# Patient Record
Sex: MALE | Race: WHITE
[De-identification: names, ages, dates, MRNs, and addresses within clinical notes are randomized per-mention and may not be internally consistent; named-entity substitution may affect disease eponyms.]

---

## 2017-07-12 ENCOUNTER — APPOINTMENT (OUTPATIENT)
Dept: CARDIOLOGY | Facility: CLINIC | Age: 67
End: 2017-07-12

## 2017-07-12 ENCOUNTER — NON-APPOINTMENT (OUTPATIENT)
Age: 67
End: 2017-07-12

## 2017-07-12 VITALS
OXYGEN SATURATION: 97 % | TEMPERATURE: 98 F | SYSTOLIC BLOOD PRESSURE: 151 MMHG | DIASTOLIC BLOOD PRESSURE: 82 MMHG | HEART RATE: 60 BPM | HEIGHT: 65.5 IN | WEIGHT: 142 LBS | BODY MASS INDEX: 23.37 KG/M2

## 2017-07-12 VITALS — SYSTOLIC BLOOD PRESSURE: 129 MMHG | DIASTOLIC BLOOD PRESSURE: 78 MMHG

## 2017-07-12 DIAGNOSIS — E78.00 PURE HYPERCHOLESTEROLEMIA, UNSPECIFIED: ICD-10-CM

## 2017-07-12 DIAGNOSIS — Z78.9 OTHER SPECIFIED HEALTH STATUS: ICD-10-CM

## 2017-07-12 DIAGNOSIS — I10 ESSENTIAL (PRIMARY) HYPERTENSION: ICD-10-CM

## 2017-07-12 DIAGNOSIS — Z00.00 ENCOUNTER FOR GENERAL ADULT MEDICAL EXAMINATION W/OUT ABNORMAL FINDINGS: ICD-10-CM

## 2017-07-12 DIAGNOSIS — E11.9 TYPE 2 DIABETES MELLITUS W/OUT COMPLICATIONS: ICD-10-CM

## 2017-07-12 DIAGNOSIS — Z86.39 PERSONAL HISTORY OF OTHER ENDOCRINE, NUTRITIONAL AND METABOLIC DISEASE: ICD-10-CM

## 2017-07-12 DIAGNOSIS — Z83.3 FAMILY HISTORY OF DIABETES MELLITUS: ICD-10-CM

## 2017-07-12 RX ORDER — VALSARTAN AND HYDROCHLOROTHIAZIDE 80; 12.5 MG/1; MG/1
80-12.5 TABLET, FILM COATED ORAL TWICE DAILY
Qty: 90 | Refills: 3 | Status: ACTIVE | COMMUNITY
Start: 2017-07-12

## 2017-07-13 LAB
25(OH)D3 SERPL-MCNC: 26.5 NG/ML
ALBUMIN SERPL ELPH-MCNC: 4 G/DL
ALP BLD-CCNC: 74 U/L
ALT SERPL-CCNC: 20 U/L
ANION GAP SERPL CALC-SCNC: 25 MMOL/L
APPEARANCE: CLEAR
AST SERPL-CCNC: 19 U/L
BASOPHILS # BLD AUTO: 0.03 K/UL
BASOPHILS NFR BLD AUTO: 0.4 %
BILIRUB SERPL-MCNC: 1.1 MG/DL
BILIRUBIN URINE: NEGATIVE
BLOOD URINE: NEGATIVE
BUN SERPL-MCNC: 16 MG/DL
CALCIUM SERPL-MCNC: 9.9 MG/DL
CHLORIDE SERPL-SCNC: 98 MMOL/L
CHOLEST SERPL-MCNC: 229 MG/DL
CHOLEST/HDLC SERPL: 4.2 RATIO
CO2 SERPL-SCNC: 20 MMOL/L
COLOR: YELLOW
CREAT SERPL-MCNC: 0.77 MG/DL
EOSINOPHIL # BLD AUTO: 0.07 K/UL
EOSINOPHIL NFR BLD AUTO: 0.9 %
GLUCOSE QUALITATIVE U: NORMAL MG/DL
GLUCOSE SERPL-MCNC: 61 MG/DL
HBA1C MFR BLD HPLC: 7.2 %
HCT VFR BLD CALC: 42.5 %
HDLC SERPL-MCNC: 55 MG/DL
HGB BLD-MCNC: 14.2 G/DL
IMM GRANULOCYTES NFR BLD AUTO: 0.3 %
KETONES URINE: NEGATIVE
LDLC SERPL CALC-MCNC: 147 MG/DL
LEUKOCYTE ESTERASE URINE: NEGATIVE
LYMPHOCYTES # BLD AUTO: 1.97 K/UL
LYMPHOCYTES NFR BLD AUTO: 26 %
MAN DIFF?: NORMAL
MCHC RBC-ENTMCNC: 30.5 PG
MCHC RBC-ENTMCNC: 33.4 GM/DL
MCV RBC AUTO: 91.4 FL
MONOCYTES # BLD AUTO: 0.76 K/UL
MONOCYTES NFR BLD AUTO: 10 %
NEUTROPHILS # BLD AUTO: 4.72 K/UL
NEUTROPHILS NFR BLD AUTO: 62.4 %
NITRITE URINE: NEGATIVE
PH URINE: 5.5
PLATELET # BLD AUTO: 236 K/UL
POTASSIUM SERPL-SCNC: 3.4 MMOL/L
PROT SERPL-MCNC: 8 G/DL
PROTEIN URINE: NEGATIVE MG/DL
PSA FREE FLD-MCNC: 31.1
PSA FREE SERPL-MCNC: 1.74 NG/ML
PSA SERPL-MCNC: 5.59 NG/ML
RBC # BLD: 4.65 M/UL
RBC # FLD: 13.1 %
SODIUM SERPL-SCNC: 143 MMOL/L
SPECIFIC GRAVITY URINE: 1.02
TRIGL SERPL-MCNC: 137 MG/DL
TSH SERPL-ACNC: 1.47 UIU/ML
UROBILINOGEN URINE: NORMAL MG/DL
WBC # FLD AUTO: 7.57 K/UL

## 2017-08-11 ENCOUNTER — OTHER (OUTPATIENT)
Age: 67
End: 2017-08-11

## 2021-04-19 ENCOUNTER — APPOINTMENT (OUTPATIENT)
Dept: DISASTER EMERGENCY | Facility: OTHER | Age: 71
End: 2021-04-19
Payer: MEDICARE

## 2021-04-19 PROCEDURE — 0001A: CPT

## 2021-05-12 ENCOUNTER — APPOINTMENT (OUTPATIENT)
Dept: DISASTER EMERGENCY | Facility: OTHER | Age: 71
End: 2021-05-12

## 2021-05-26 ENCOUNTER — APPOINTMENT (OUTPATIENT)
Dept: DISASTER EMERGENCY | Facility: OTHER | Age: 71
End: 2021-05-26

## 2024-09-08 PROBLEM — Z86.79 HISTORY OF HYPERTENSION: Status: RESOLVED | Noted: 2024-09-08 | Resolved: 2024-09-08

## 2024-09-08 PROBLEM — R73.03 PRE-DIABETES: Status: RESOLVED | Noted: 2024-09-08 | Resolved: 2024-09-08

## 2024-09-08 PROBLEM — Z78.9 NON-SMOKER: Status: ACTIVE | Noted: 2024-09-08

## 2024-09-08 PROBLEM — Z00.00 ENCOUNTER FOR PREVENTIVE HEALTH EXAMINATION: Status: ACTIVE | Noted: 2024-09-08

## 2024-09-08 PROBLEM — Z78.9 NO PERTINENT FAMILY HISTORY: Status: ACTIVE | Noted: 2024-09-08

## 2024-09-10 ENCOUNTER — TRANSCRIPTION ENCOUNTER (OUTPATIENT)
Age: 74
End: 2024-09-10

## 2024-09-13 ENCOUNTER — APPOINTMENT (OUTPATIENT)
Dept: ORTHOPEDIC SURGERY | Facility: CLINIC | Age: 74
End: 2024-09-13
Payer: MEDICARE

## 2024-09-13 VITALS — BODY MASS INDEX: 22.49 KG/M2 | WEIGHT: 145 LBS | HEIGHT: 67.5 IN

## 2024-09-13 DIAGNOSIS — M54.16 RADICULOPATHY, LUMBAR REGION: ICD-10-CM

## 2024-09-13 PROCEDURE — 99204 OFFICE O/P NEW MOD 45 MIN: CPT

## 2024-09-13 RX ORDER — METHYLPREDNISOLONE 4 MG/1
4 TABLET ORAL
Qty: 1 | Refills: 0 | Status: ACTIVE | COMMUNITY
Start: 2024-09-13 | End: 1900-01-01

## 2024-09-13 NOTE — HISTORY OF PRESENT ILLNESS
[Lower back] : lower back [Left Arm] : left arm [Right Leg] : right leg [Gradual] : gradual [4] : 4 [10] : 10 [Dull/Aching] : dull/aching [Radiating] : radiating [Sharp] : sharp [Intermittent] : intermittent [Household chores] : household chores [Leisure] : leisure [Sleep] : sleep [Walking/activity] : walking/activity [Sitting] : sitting [Lying in bed] : lying in bed [Full time] : Work status: full time [de-identified] : 9/13/2024 NP complaining of L Back radiating into B Legs L Greater than R. Most painful sitting or laying down. Weakness R Foot. Patient was seen [facility] and given MDP . Taking vicodin as well.  No numbness or tingling.  No issues with fine motor movement or dexterity.  No fevers or chills. No bowel or bladder symptoms. No saddle anesthesia [] : Post Surgical Visit: no [FreeTextEntry7] : B Legs  [de-identified] : 2016 [de-identified] : Wilfredo

## 2024-09-13 NOTE — ASSESSMENT
[FreeTextEntry1] : 73 M with Low back pain and BLLE radic On MDP with mild relief taking Vicodin as well  Repeat MDP MRI L spine FU after MRI Pain management referral

## 2024-09-17 RX ORDER — DIAZEPAM 2 MG/1
2 TABLET ORAL
Qty: 1 | Refills: 0 | Status: ACTIVE | COMMUNITY
Start: 2024-09-17 | End: 1900-01-01

## 2024-09-19 ENCOUNTER — APPOINTMENT (OUTPATIENT)
Dept: MRI IMAGING | Facility: CLINIC | Age: 74
End: 2024-09-19
Payer: MEDICARE

## 2024-09-19 PROCEDURE — 72148 MRI LUMBAR SPINE W/O DYE: CPT

## 2024-09-27 ENCOUNTER — APPOINTMENT (OUTPATIENT)
Dept: PAIN MANAGEMENT | Facility: CLINIC | Age: 74
End: 2024-09-27
Payer: MEDICARE

## 2024-09-27 VITALS — BODY MASS INDEX: 22.29 KG/M2 | HEIGHT: 67 IN | WEIGHT: 142 LBS

## 2024-09-27 DIAGNOSIS — M54.17 RADICULOPATHY, LUMBOSACRAL REGION: ICD-10-CM

## 2024-09-27 DIAGNOSIS — M54.16 RADICULOPATHY, LUMBAR REGION: ICD-10-CM

## 2024-09-27 DIAGNOSIS — M48.07 SPINAL STENOSIS, LUMBOSACRAL REGION: ICD-10-CM

## 2024-09-27 DIAGNOSIS — M79.10 MYALGIA, UNSPECIFIED SITE: ICD-10-CM

## 2024-09-27 DIAGNOSIS — M54.50 LOW BACK PAIN, UNSPECIFIED: ICD-10-CM

## 2024-09-27 PROCEDURE — 99204 OFFICE O/P NEW MOD 45 MIN: CPT

## 2024-09-27 NOTE — DISCUSSION/SUMMARY
[de-identified] : After discussing various treatment options with the patient including but not limited to oral medications, physical therapy, exercise modalities as well as interventional spinal injections, we have decided with the following plan:  - Continue Home exercises, stretching, activity modification, physical therapy, and conservative care. - MRI report and/or images was reviewed and discussed with the patient. - Recommend L4-5 Lumbar Epidural Steroid Injection under fluoroscopic guidance with image. (LEFT)  - The risks, benefits and alternatives of the proposed procedure were explained in detail with the patient. The risks outlined include but are not limited to infection, bleeding, post-dural puncture headache, nerve injury, a temporary increase in pain, failure to resolve symptoms, allergic reaction, symptom recurrence, and possible elevation of blood sugar in diabetics. All questions were answered to patient's apparent satisfaction and he/she verbalized an understanding. - Patient is presenting with acute/sub-acute radicular pain with impairment in ADLs and functionality.  The pain has not responded to conservative care including NSAID therapy and/or physical therapy.  There is no bleeding tendency, unstable medical condition, or systemic infection. - Follow up in 1-2 weeks post injection for re-evaluation.

## 2024-09-27 NOTE — HISTORY OF PRESENT ILLNESS
[Lower back] : lower back [Buttock] : buttock [Left Leg] : left leg [5] : 5 [Burning] : burning [Dull/Aching] : dull/aching [Radiating] : radiating [Sharp] : sharp [Throbbing] : throbbing [Tingling] : tingling [Constant] : constant [Household chores] : household chores [Leisure] : leisure [Sleep] : sleep [Meds] : meds [Walking/activity] : walking/activity [Sitting] : sitting [Standing] : standing [FreeTextEntry1] : Initial HPI 09/27/2024: Pain with chronic back pain recently worsened over the past few weeks. Pain is on the LEFT side of the lower back and radiates into the left buttock and left knee cap described as a sharp shooting pain with associated numbness in the knee. Saw Dr. Jaden Beebe who recommended pain mgt.    MRI Lumbar Spine 9/19/24 independently reviewed: multilevel HNPs and stenosis; L2-3 lleft HNP; L4-5 spondy with moderate stenosis; L5-S1 HNP with R>L S1 contact  Pt aware of left kidney cyst and urinary bladder thickening and is following with a urologist.  Conservative Care: has done PT in the past with no relief; Acupuncture with some relief  Pain Medications: ASA; completed MDP which took the edge off; Tylenol with no relief; Norco PRN Past Injections: none Spine surgery: none  Blood thinners: none  [] : no [FreeTextEntry7] : L leg  [FreeTextEntry9] : Medrol; acupuncture  [de-identified] : x-ray and MRI

## 2024-09-27 NOTE — PHYSICAL EXAM
[de-identified] : Constitutional; Appears well, no apparent distress Ability to communicate: Normal  Respiratory: non-labored breathing Skin: No rash noted Head: Normocephalic, atraumatic Neck: no visible thyroid enlargement Eyes: Extraocular movements intact Neurologic: Alert and oriented x3 Psychiatric: normal mood, affect and behavior [] : non-antalgic

## 2024-10-16 ENCOUNTER — APPOINTMENT (OUTPATIENT)
Age: 74
End: 2024-10-16
Payer: MEDICARE

## 2024-10-16 PROCEDURE — 62323 NJX INTERLAMINAR LMBR/SAC: CPT

## 2024-11-06 ENCOUNTER — APPOINTMENT (OUTPATIENT)
Dept: ORTHOPEDIC SURGERY | Facility: CLINIC | Age: 74
End: 2024-11-06
Payer: COMMERCIAL

## 2024-11-06 VITALS — BODY MASS INDEX: 22.29 KG/M2 | HEIGHT: 67 IN | WEIGHT: 142 LBS

## 2024-11-06 DIAGNOSIS — M19.171 POST-TRAUMATIC OSTEOARTHRITIS, RIGHT ANKLE AND FOOT: ICD-10-CM

## 2024-11-06 DIAGNOSIS — S92.351A DISPLACED FRACTURE OF FIFTH METATARSAL BONE, RIGHT FOOT, INITIAL ENCOUNTER FOR CLOSED FRACTURE: ICD-10-CM

## 2024-11-06 PROCEDURE — 73590 X-RAY EXAM OF LOWER LEG: CPT | Mod: RT

## 2024-11-06 PROCEDURE — 73630 X-RAY EXAM OF FOOT: CPT | Mod: RT

## 2024-11-06 PROCEDURE — 73600 X-RAY EXAM OF ANKLE: CPT | Mod: RT

## 2024-11-06 PROCEDURE — L4361: CPT | Mod: RT

## 2024-11-06 PROCEDURE — 99204 OFFICE O/P NEW MOD 45 MIN: CPT | Mod: 25

## 2024-11-08 ENCOUNTER — APPOINTMENT (OUTPATIENT)
Dept: PAIN MANAGEMENT | Facility: CLINIC | Age: 74
End: 2024-11-08
Payer: MEDICARE

## 2024-11-08 VITALS — HEIGHT: 67 IN | BODY MASS INDEX: 22.29 KG/M2 | WEIGHT: 142 LBS

## 2024-11-08 DIAGNOSIS — M54.50 LOW BACK PAIN, UNSPECIFIED: ICD-10-CM

## 2024-11-08 DIAGNOSIS — M79.10 MYALGIA, UNSPECIFIED SITE: ICD-10-CM

## 2024-11-08 DIAGNOSIS — M54.17 RADICULOPATHY, LUMBOSACRAL REGION: ICD-10-CM

## 2024-11-08 DIAGNOSIS — M48.07 SPINAL STENOSIS, LUMBOSACRAL REGION: ICD-10-CM

## 2024-11-08 DIAGNOSIS — M54.16 RADICULOPATHY, LUMBAR REGION: ICD-10-CM

## 2024-11-08 PROCEDURE — 99214 OFFICE O/P EST MOD 30 MIN: CPT

## 2024-12-04 ENCOUNTER — APPOINTMENT (OUTPATIENT)
Age: 74
End: 2024-12-04

## 2024-12-11 ENCOUNTER — APPOINTMENT (OUTPATIENT)
Dept: ORTHOPEDIC SURGERY | Facility: CLINIC | Age: 74
End: 2024-12-11
Payer: COMMERCIAL

## 2024-12-11 VITALS — HEIGHT: 67 IN | WEIGHT: 142 LBS | BODY MASS INDEX: 22.29 KG/M2

## 2024-12-11 DIAGNOSIS — S92.352D DISPLACED FRACTURE OF FIFTH METATARSAL BONE, LEFT FOOT, SUBSEQUENT ENCOUNTER FOR FRACTURE WITH ROUTINE HEALING: ICD-10-CM

## 2024-12-11 PROCEDURE — 73630 X-RAY EXAM OF FOOT: CPT | Mod: RT

## 2024-12-11 PROCEDURE — 99213 OFFICE O/P EST LOW 20 MIN: CPT

## 2024-12-20 ENCOUNTER — APPOINTMENT (OUTPATIENT)
Dept: PAIN MANAGEMENT | Facility: CLINIC | Age: 74
End: 2024-12-20

## 2025-01-08 ENCOUNTER — APPOINTMENT (OUTPATIENT)
Dept: ORTHOPEDIC SURGERY | Facility: CLINIC | Age: 75
End: 2025-01-08
Payer: COMMERCIAL

## 2025-01-08 VITALS — HEIGHT: 67 IN | BODY MASS INDEX: 22.29 KG/M2 | WEIGHT: 142 LBS

## 2025-01-08 DIAGNOSIS — S92.351D DISPLACED FRACTURE OF FIFTH METATARSAL BONE, RIGHT FOOT, SUBSEQUENT ENCOUNTER FOR FRACTURE WITH ROUTINE HEALING: ICD-10-CM

## 2025-01-08 PROCEDURE — 99213 OFFICE O/P EST LOW 20 MIN: CPT

## 2025-01-08 PROCEDURE — 73630 X-RAY EXAM OF FOOT: CPT | Mod: RT

## 2025-02-05 ENCOUNTER — APPOINTMENT (OUTPATIENT)
Dept: ORTHOPEDIC SURGERY | Facility: CLINIC | Age: 75
End: 2025-02-05

## 2025-04-04 ENCOUNTER — APPOINTMENT (OUTPATIENT)
Dept: PAIN MANAGEMENT | Facility: CLINIC | Age: 75
End: 2025-04-04
Payer: MEDICARE

## 2025-04-04 VITALS — HEIGHT: 67 IN | WEIGHT: 142 LBS | BODY MASS INDEX: 22.29 KG/M2

## 2025-04-04 DIAGNOSIS — M54.16 RADICULOPATHY, LUMBAR REGION: ICD-10-CM

## 2025-04-04 DIAGNOSIS — M54.50 LOW BACK PAIN, UNSPECIFIED: ICD-10-CM

## 2025-04-04 DIAGNOSIS — M54.17 RADICULOPATHY, LUMBOSACRAL REGION: ICD-10-CM

## 2025-04-04 PROCEDURE — 99214 OFFICE O/P EST MOD 30 MIN: CPT

## 2025-04-22 ENCOUNTER — APPOINTMENT (OUTPATIENT)
Dept: PAIN MANAGEMENT | Facility: CLINIC | Age: 75
End: 2025-04-22
Payer: MEDICARE

## 2025-04-22 DIAGNOSIS — M54.17 RADICULOPATHY, LUMBOSACRAL REGION: ICD-10-CM

## 2025-04-22 PROCEDURE — 64484 NJX AA&/STRD TFRM EPI L/S EA: CPT | Mod: LT,59

## 2025-04-22 PROCEDURE — J3490M: CUSTOM

## 2025-04-22 PROCEDURE — 64483 NJX AA&/STRD TFRM EPI L/S 1: CPT | Mod: LT

## 2025-05-09 ENCOUNTER — APPOINTMENT (OUTPATIENT)
Dept: PAIN MANAGEMENT | Facility: CLINIC | Age: 75
End: 2025-05-09
Payer: MEDICARE

## 2025-05-09 VITALS — WEIGHT: 142 LBS | HEIGHT: 67 IN | BODY MASS INDEX: 22.29 KG/M2

## 2025-05-09 DIAGNOSIS — M54.17 RADICULOPATHY, LUMBOSACRAL REGION: ICD-10-CM

## 2025-05-09 DIAGNOSIS — M54.50 LOW BACK PAIN, UNSPECIFIED: ICD-10-CM

## 2025-05-09 DIAGNOSIS — M54.16 RADICULOPATHY, LUMBAR REGION: ICD-10-CM

## 2025-05-09 PROCEDURE — 99214 OFFICE O/P EST MOD 30 MIN: CPT
